# Patient Record
Sex: MALE | Race: ASIAN | NOT HISPANIC OR LATINO
[De-identification: names, ages, dates, MRNs, and addresses within clinical notes are randomized per-mention and may not be internally consistent; named-entity substitution may affect disease eponyms.]

---

## 2018-10-30 ENCOUNTER — APPOINTMENT (OUTPATIENT)
Dept: UROLOGY | Facility: CLINIC | Age: 63
End: 2018-10-30
Payer: MEDICARE

## 2018-10-30 VITALS
RESPIRATION RATE: 18 BRPM | HEIGHT: 67 IN | BODY MASS INDEX: 20.09 KG/M2 | WEIGHT: 128 LBS | HEART RATE: 77 BPM | OXYGEN SATURATION: 98 % | SYSTOLIC BLOOD PRESSURE: 127 MMHG | TEMPERATURE: 97.4 F | DIASTOLIC BLOOD PRESSURE: 67 MMHG

## 2018-10-30 LAB
ANION GAP SERPL CALC-SCNC: 10 MMOL/L
BUN SERPL-MCNC: 43 MG/DL
CALCIUM SERPL-MCNC: 9.8 MG/DL
CHLORIDE SERPL-SCNC: 104 MMOL/L
CO2 SERPL-SCNC: 21 MMOL/L
CREAT SERPL-MCNC: 3.65 MG/DL
GLUCOSE SERPL-MCNC: 203 MG/DL
POTASSIUM SERPL-SCNC: 4.2 MMOL/L
PSA FREE FLD-MCNC: 37.5
PSA FREE SERPL-MCNC: 0.54 NG/ML
PSA SERPL-MCNC: 1.44 NG/ML
SODIUM SERPL-SCNC: 135 MMOL/L

## 2018-10-30 PROCEDURE — 99214 OFFICE O/P EST MOD 30 MIN: CPT

## 2018-10-31 LAB
APPEARANCE: CLEAR
BACTERIA: NEGATIVE
BILIRUBIN URINE: NEGATIVE
BLOOD URINE: ABNORMAL
COLOR: YELLOW
GLUCOSE QUALITATIVE U: 500 MG/DL
HYALINE CASTS: 1 /LPF
KETONES URINE: NEGATIVE
LEUKOCYTE ESTERASE URINE: NEGATIVE
MICROSCOPIC-UA: NORMAL
NITRITE URINE: NEGATIVE
PH URINE: 6
PROTEIN URINE: 100 MG/DL
RED BLOOD CELLS URINE: 1 /HPF
SPECIFIC GRAVITY URINE: 1.01
SQUAMOUS EPITHELIAL CELLS: 0 /HPF
UROBILINOGEN URINE: NEGATIVE MG/DL
WHITE BLOOD CELLS URINE: 1 /HPF

## 2018-11-27 ENCOUNTER — APPOINTMENT (OUTPATIENT)
Dept: UROLOGY | Facility: CLINIC | Age: 63
End: 2018-11-27
Payer: MEDICARE

## 2018-11-27 VITALS
TEMPERATURE: 97.6 F | BODY MASS INDEX: 19.89 KG/M2 | HEART RATE: 76 BPM | WEIGHT: 127 LBS | OXYGEN SATURATION: 98 % | DIASTOLIC BLOOD PRESSURE: 68 MMHG | SYSTOLIC BLOOD PRESSURE: 116 MMHG | RESPIRATION RATE: 17 BRPM

## 2018-11-27 PROCEDURE — 99214 OFFICE O/P EST MOD 30 MIN: CPT

## 2019-05-28 ENCOUNTER — APPOINTMENT (OUTPATIENT)
Dept: UROLOGY | Facility: CLINIC | Age: 64
End: 2019-05-28
Payer: MEDICARE

## 2019-05-28 VITALS
DIASTOLIC BLOOD PRESSURE: 65 MMHG | HEART RATE: 67 BPM | BODY MASS INDEX: 19.73 KG/M2 | TEMPERATURE: 97.2 F | SYSTOLIC BLOOD PRESSURE: 119 MMHG | WEIGHT: 126 LBS | RESPIRATION RATE: 17 BRPM | OXYGEN SATURATION: 97 %

## 2019-05-28 PROCEDURE — 99214 OFFICE O/P EST MOD 30 MIN: CPT

## 2019-05-28 NOTE — LETTER BODY
[FreeTextEntry1] : Gato Wallis MD\par [address], \[place] [address]\par Phone: [phone]\par \par Dear Dr. Wallis,\par \par Reason for visit: Abnormal urinalysis, proteinuria. Chronic renal impairment. BPH.\par \par This is a 63 year-old gentleman with history of HIV was found to have abnormal urinalysis, chronic renal impairment and BPH. His most recent blood test demonstrated undetectable viral count. The patient reports he has an unremarkable renal ultrasound. He returns today for follow up. Since his last visit, the patient reports taking Flomax regularly. He reports improved urinary symptoms. He denies any hematuria or urinary incontinence. His symptoms are aggravated by hydration. He denies any alleviating factors. The patient denies any interference of function. The patient is entirely asymptomatic. The past medical history, family history and social history are unchanged. All other review of systems are negative. Patient denies any changes in medications. Medication list was reconciled.\par  \par On examination, the patient is a frail-appearing gentleman in no acute distress. He is alert and oriented follows commands. He has normal mood and affect. He is normocephalic. Neck is supple. Respirations are unlabored. His abdomen is soft and nontender. Bladder is nonpalpable. No CVA tenderness. Neurologically he is grossly intact. No peripheral edema. Skin without gross abnormality. \par \par His BMP demonstrated renal failure, creatinine 3.65. His PSA was 1.44, which is within normal limits. His urinalysis demonstrated proteinuria, 100 mg/dL. \par \par Assessment: Abnormal urinalysis, proteinuria. Chronic renal impairment, Stage 5. BPH.\par \par I counseled the patient. In terms of his proteinuria, the patient reports he has consulted nephrology. In terms of his BPH, the patient is complains of headaches and is concerned that for side effects of prostate medications. I recommend the patient to discontinue terazosin and continue Flomax as directed. Risks and alternatives were discussed. I answered the patient questions. The patient will follow-up as directed and will contact me with any questions or concerns. Thank you for the opportunity to participate in the care of Mr. GRIGGS. I will keep you updated on his progress. \par \par Plan: Continue Flomax. Follow up in one year.\par

## 2019-05-28 NOTE — ADDENDUM
[FreeTextEntry1] :  I, Alla Mondragon, acted solely as a scribe for Dr. Sebastian Kern. The documentation recorded by the scribe accurately reflects the service I personally performed and the decision by me.\par

## 2020-05-29 ENCOUNTER — APPOINTMENT (OUTPATIENT)
Dept: UROLOGY | Facility: CLINIC | Age: 65
End: 2020-05-29
Payer: MEDICARE

## 2020-05-29 VITALS
OXYGEN SATURATION: 98 % | TEMPERATURE: 97.7 F | SYSTOLIC BLOOD PRESSURE: 148 MMHG | DIASTOLIC BLOOD PRESSURE: 72 MMHG | RESPIRATION RATE: 18 BRPM | HEART RATE: 70 BPM

## 2020-05-29 PROCEDURE — 99214 OFFICE O/P EST MOD 30 MIN: CPT

## 2020-05-29 NOTE — ADDENDUM
[FreeTextEntry1] : Entered by Octavio Emery, acting as scribe for Dr. Sebastian Kern.\par \par The documentation recorded by the scribe accurately reflects the service I personally performed and the decisions made by me.

## 2020-05-29 NOTE — LETTER BODY
[FreeTextEntry1] : Gato Wallis MD\par [address], \[place] [address]\par Phone: [phone]\par \par Dear Dr. Wallis,\par \par Reason for visit: Chronic renal impairment, stage 5. BPH.\par \par This is a 64 year-old gentleman with history of HIV presenting with abnormal urinalysis, chronic renal impairment, stage 5 and BPH. He returns today for follow up. Since his last visit, the patient continues to take Flomax once a day regularly without any side effects or difficulties. He reports of stable urinary symptoms with medication. He denies any interval difficulties or complaints. He denies any hematuria, dysuria, or urinary incontinence. He denies any changes in health. He has no pain currently. The past medical history, family history and social history are unchanged. All other review of systems are negative. Patient denies any changes in medications. Medication list was reconciled. \par  \par On examination, the patient is a frail-appearing gentleman in no acute distress. He is alert and oriented follows commands. He has normal mood and affect. He is normocephalic. Neck is supple. Respirations are unlabored. His abdomen is soft and nontender. Bladder is nonpalpable. No CVA tenderness. Neurologically he is grossly intact. No peripheral edema. Skin without gross abnormality. \par \par His BMP in 2018 demonstrated renal failure, creatinine 3.65. His PSA was 1.44, which is within normal limits. His urinalysis demonstrated proteinuria, 100 mg/dL. \par \par Assessment: Proteinuria. Chronic renal impairment, Stage 5. BPH.\par \par I counseled the patient. In terms of his BPH, the patient reports of stable urinary symptoms with medical therapy. I renewed the patient's prescription for Flomax today. I encouraged the patient to continue medication regularly as directed. He will obtain BMP and PSA today to ensure stability. Risks and alternatives were discussed. I answered the patient questions. The patient will follow-up as directed and will contact me with any questions or concerns. Thank you for the opportunity to participate in the care of Mr. GRIGGS. I will keep you updated on his progress. \par \par Plan: Continue Flomax. BMP. PSA. Follow up as directed.\par \par I spent over half of the 25-minute encounter counseling the patient and coordinating his care.

## 2020-05-30 LAB
ANION GAP SERPL CALC-SCNC: 15 MMOL/L
BUN SERPL-MCNC: 47 MG/DL
CALCIUM SERPL-MCNC: 9 MG/DL
CHLORIDE SERPL-SCNC: 110 MMOL/L
CO2 SERPL-SCNC: 13 MMOL/L
CREAT SERPL-MCNC: 4.18 MG/DL
GLUCOSE SERPL-MCNC: 90 MG/DL
POTASSIUM SERPL-SCNC: 4.4 MMOL/L
PSA FREE FLD-MCNC: 42 %
PSA FREE SERPL-MCNC: 0.6 NG/ML
PSA SERPL-MCNC: 1.43 NG/ML
SODIUM SERPL-SCNC: 139 MMOL/L

## 2021-05-02 ENCOUNTER — RX RENEWAL (OUTPATIENT)
Age: 66
End: 2021-05-02

## 2021-05-28 ENCOUNTER — APPOINTMENT (OUTPATIENT)
Dept: UROLOGY | Facility: CLINIC | Age: 66
End: 2021-05-28
Payer: MEDICARE

## 2021-05-28 VITALS
HEART RATE: 83 BPM | OXYGEN SATURATION: 97 % | TEMPERATURE: 98.3 F | SYSTOLIC BLOOD PRESSURE: 131 MMHG | WEIGHT: 125 LBS | DIASTOLIC BLOOD PRESSURE: 74 MMHG | BODY MASS INDEX: 19.58 KG/M2 | RESPIRATION RATE: 18 BRPM

## 2021-05-28 DIAGNOSIS — R35.1 NOCTURIA: ICD-10-CM

## 2021-05-28 DIAGNOSIS — B20 HUMAN IMMUNODEFICIENCY VIRUS [HIV] DISEASE: ICD-10-CM

## 2021-05-28 DIAGNOSIS — N36.8 OTHER SPECIFIED DISORDERS OF URETHRA: ICD-10-CM

## 2021-05-28 PROCEDURE — 99214 OFFICE O/P EST MOD 30 MIN: CPT

## 2021-05-29 LAB
PSA FREE FLD-MCNC: 36 %
PSA FREE SERPL-MCNC: 1.01 NG/ML
PSA SERPL-MCNC: 2.83 NG/ML

## 2021-05-29 NOTE — ADDENDUM
[FreeTextEntry1] : Entered by Oscar Pressley, acting as scribe for Dr. Sebastian Kern.\par \par The documentation recorded by the scribe accurately reflects the service I personally performed and the decisions made by me.\par

## 2021-05-29 NOTE — LETTER BODY
[FreeTextEntry1] : Gato Wallis MD\par [address], \[place] [address]\par Phone: [phone]\par \par Dear Dr. Wallis,\par \par Reason for visit: Chronic renal impairment, stage 5. BPH.\par \par This is a 64 year-old gentleman with history of HIV presenting with previous abnormal urinalysis, chronic renal impairment, stage 5 and chronic BPH. The patient returns today for follow up. Since he was last seen, he continues to take Flomax QD regularly without any side effects or difficulties. The patient notes stable urine flow and urinary symptoms on medical therapy. He denies any interval difficulties or complaints. He denies any hematuria, dysuria, or urinary incontinence.The patient is still awaiting a kidney transplant He denies any changes in health. He has no pain currently. The past medical history, family history and social history are unchanged. All other review of systems are negative. Patient denies any changes in medications. Medication list was reconciled. \par  \par On examination, the patient is a frail-appearing gentleman in no acute distress. He is alert and oriented follows commands. He has normal mood and affect. He is normocephalic. Neck is supple. Respirations are unlabored. His abdomen is soft and nontender. Bladder is nonpalpable. No CVA tenderness. Neurologically he is grossly intact. No peripheral edema. Skin without gross abnormality. On rectal examination, there is normal sphincter tone. The prostate is clinically benign without focal induration or nodularity. \par \par His last BMP in May 2020 demonstrated significantly impaired renal functions, creatinine 4.18. His PSA was 1.43, which is within normal limits. \par \par Assessment: Proteinuria. Chronic renal impairment, Stage 5. BPH.\par \par I counseled the patient. In terms of his BPH, his symptoms are stabled on medical therapy. I recommended he continue taking Flomax QD. I renewed the patient's prescription for Flomax today. I encouraged the patient to continue medications regularly as directed. The patient will repeat BMP and PSA today to ensure stability. Risks and alternatives were discussed. I answered the patient questions. The patient will follow-up in 1 year and will contact me with any questions or concerns. Thank you for the opportunity to participate in the care of Mr. GRIGGS. I will keep you updated on his progress.\par \par Plan: Continue Flomax. BMP. PSA. Follow up 1 year. \par \par I spent 30-minutes time today on all issues related to this patient on today date of service including non face to face time. \par

## 2021-06-01 LAB
ANION GAP SERPL CALC-SCNC: 16 MMOL/L
BACTERIA UR CULT: ABNORMAL
BUN SERPL-MCNC: 65 MG/DL
CALCIUM SERPL-MCNC: 10.3 MG/DL
CHLORIDE SERPL-SCNC: 99 MMOL/L
CO2 SERPL-SCNC: 20 MMOL/L
CREAT SERPL-MCNC: 4.92 MG/DL
GLUCOSE SERPL-MCNC: 137 MG/DL
POTASSIUM SERPL-SCNC: 3.7 MMOL/L
SODIUM SERPL-SCNC: 135 MMOL/L

## 2022-05-27 ENCOUNTER — APPOINTMENT (OUTPATIENT)
Dept: UROLOGY | Facility: CLINIC | Age: 67
End: 2022-05-27
Payer: MEDICARE

## 2022-05-27 VITALS
SYSTOLIC BLOOD PRESSURE: 129 MMHG | HEART RATE: 93 BPM | TEMPERATURE: 98.1 F | DIASTOLIC BLOOD PRESSURE: 63 MMHG | WEIGHT: 122 LBS | BODY MASS INDEX: 19.11 KG/M2 | RESPIRATION RATE: 18 BRPM | OXYGEN SATURATION: 95 %

## 2022-05-27 PROCEDURE — 99214 OFFICE O/P EST MOD 30 MIN: CPT

## 2022-05-28 LAB
ANION GAP SERPL CALC-SCNC: 13 MMOL/L
APPEARANCE: CLEAR
BACTERIA UR CULT: NORMAL
BACTERIA: NEGATIVE
BILIRUBIN URINE: NEGATIVE
BLOOD URINE: NORMAL
BUN SERPL-MCNC: 63 MG/DL
CALCIUM SERPL-MCNC: 9.2 MG/DL
CHLORIDE SERPL-SCNC: 111 MMOL/L
CO2 SERPL-SCNC: 15 MMOL/L
COLOR: NORMAL
CREAT SERPL-MCNC: 5.35 MG/DL
EGFR: 11 ML/MIN/1.73M2
GLUCOSE QUALITATIVE U: ABNORMAL
GLUCOSE SERPL-MCNC: 172 MG/DL
HYALINE CASTS: 0 /LPF
KETONES URINE: NEGATIVE
LEUKOCYTE ESTERASE URINE: NEGATIVE
MICROSCOPIC-UA: NORMAL
NITRITE URINE: NEGATIVE
PH URINE: 6
POTASSIUM SERPL-SCNC: 4.7 MMOL/L
PROTEIN URINE: ABNORMAL
PSA FREE FLD-MCNC: 35 %
PSA FREE SERPL-MCNC: 0.75 NG/ML
PSA SERPL-MCNC: 2.14 NG/ML
RED BLOOD CELLS URINE: 1 /HPF
SODIUM SERPL-SCNC: 139 MMOL/L
SPECIFIC GRAVITY URINE: 1.01
SQUAMOUS EPITHELIAL CELLS: 0 /HPF
UROBILINOGEN URINE: NORMAL
WHITE BLOOD CELLS URINE: 0 /HPF

## 2022-06-03 NOTE — LETTER BODY
[FreeTextEntry1] : Gato Wallis MD\par [address], \[place] [address]\par Phone: [phone]\par \par Dear Dr. Wallis,\par \par Reason for visit: Chronic renal impairment, stage 5. BPH.\par \par This is a 66 year-old gentleman with history of HIV presenting with previous abnormal urinalysis, chronic renal impairment, stage 5 and chronic BPH. The patient returns today for follow up. Since he was last seen, he denies any interval difficulties or complaints. He is not taking any prostatic medications. His previous urine culture was positive for Streptococcus agalactiae. The patient declined antibiotics since he was asymptomatic. Currently, he denies any hematuria, dysuria, or urinary incontinence. The patient is still awaiting a kidney transplant. He has no pain currently. The past medical history, family history and social history are unchanged. All other review of systems are negative. Patient denies any changes in medications. Medication list was reconciled. \par  \par On examination, the patient is a frail-appearing gentleman in no acute distress. He is alert and oriented follows commands. He has normal mood and affect. He is normocephalic. Neck is supple. Respirations are unlabored. His abdomen is soft and nontender. Bladder is nonpalpable. No CVA tenderness. Neurologically he is grossly intact. No peripheral edema. Skin without gross abnormality. On rectal examination, there is normal sphincter tone. The prostate is clinically benign without focal induration or nodularity. \par \par His last BMP in May 2021 demonstrated significantly impaired renal functions, creatinine 4.92.  His PSA was 2.83, which is within normal limits. His urine culture was positive for Streptococcus agalactiae. \par \par Assessment: Chronic renal impairment, Stage 5. BPH.\par \par I counseled the patient. In terms of his chronic renal impairment, the patient is still awaiting a transplant. I encouraged the patient to continue following up with Nephrology.  In terms of his BPH, the patient offer no urinary complaints. I recommended he repeat BMP and PSA today to ensure stability. I also recommended he repeat urinalysis and urine culture to evaluate for infection. Patient understands that if he develops gross hematuria or any urinary discomfort, he will contact me for further evaluation. Risks and alternatives were discussed. I answered the patient questions. The patient will follow-up in 1 year and will contact me with any questions or concerns. Thank you for the opportunity to participate in the care of Mr. GRIGGS. I will keep you updated on his progress.\par \par Plan: BMP. PSA. Urinalysis. Urine culture. Follow up 1 year.

## 2022-06-03 NOTE — ADDENDUM
[FreeTextEntry1] : Entered by Oscar Pressley, acting as scribe for Dr. Sebastian Kern.\par \par The documentation recorded by the scribe accurately reflects the service I personally performed and the decisions made by me.

## 2023-06-02 ENCOUNTER — APPOINTMENT (OUTPATIENT)
Dept: UROLOGY | Facility: CLINIC | Age: 68
End: 2023-06-02
Payer: MEDICARE

## 2023-06-02 VITALS
SYSTOLIC BLOOD PRESSURE: 134 MMHG | HEART RATE: 106 BPM | BODY MASS INDEX: 16.6 KG/M2 | OXYGEN SATURATION: 100 % | TEMPERATURE: 98.1 F | RESPIRATION RATE: 18 BRPM | DIASTOLIC BLOOD PRESSURE: 73 MMHG | WEIGHT: 106 LBS

## 2023-06-02 DIAGNOSIS — B20 HUMAN IMMUNODEFICIENCY VIRUS [HIV] DISEASE: ICD-10-CM

## 2023-06-02 PROCEDURE — 99213 OFFICE O/P EST LOW 20 MIN: CPT

## 2023-06-02 NOTE — LETTER BODY
[FreeTextEntry1] : Gato Wallis MD\par [address], \[place] [address]\par Phone: [phone]\par \par Dear Dr. Wallis,\par \par Reason for visit: Chronic renal impairment, stage 5. BPH.\par \par This is a 67 year-old gentleman with history of HIV presenting with previous abnormal urinalysis, chronic renal impairment, stage 5 and chronic BPH. The patient returns today for follow up. Since he was last seen, he reports taking Flomax QD regularly without any difficulties or side effects. Patient reports improvement of symptoms on medical therapy. He underwent kidney and liver transplant 5 months ago. He now has diabetes from renal therapy. His previous urine culture was positive for Streptococcus agalactiae. The patient declined antibiotics since he was asymptomatic. Currently, he denies any hematuria, dysuria, or urinary incontinence. The patient is still awaiting a kidney transplant. He has no pain currently. The past medical history, family history and social history are unchanged. All other review of systems are negative. Patient denies any changes in medications. Medication list was reconciled. \par  \par On examination, the patient is a frail-appearing gentleman in no acute distress. He is alert and oriented follows commands. He has normal mood and affect. He is normocephalic. Neck is supple. Respirations are unlabored. His abdomen is soft and nontender. Bladder is nonpalpable. No CVA tenderness. Neurologically he is grossly intact. No peripheral edema. Skin without gross abnormality. On rectal examination, there is normal sphincter tone. The prostate is clinically benign without focal induration or nodularity.\par \par Assessment: Chronic renal impairment, Stage 5 s/p transplant. BPH.\par \par I counseled the patient. In terms of his chronic renal impairment, the patient underwent renal and liver transplant. He now has diabetes from renal therapy. I recommended he follow up with transplant surgery for further evaluation. In terms of his BPH, the patient reports doing well on Flomax QD. I renewed the patient's prescription for Flomax today. I encouraged the patient to continue medications regularly as directed. I recommended he repeat BMP and PSA today to ensure stability. Patient understands that if he develops gross hematuria or any urinary discomfort, he will contact me for further evaluation. Risks and alternatives were discussed. I answered the patient questions. The patient will follow-up in 1 year and will contact me with any questions or concerns. Thank you for the opportunity to participate in the care of Mr. GRIGGS. I will keep you updated on his progress.\par \par Plan: Continue Flomax. Follow up with transplant surgery. BMP. PSA. Follow up 1 year.

## 2023-06-02 NOTE — ADDENDUM
[FreeTextEntry1] : Entered by CHANCE CHAVES, acting as scribe for Dr. Sebastian Kern.\par The documentation recorded by the scribe accurately reflects the service I personally performed and the decisions made by me.

## 2023-06-07 ENCOUNTER — NON-APPOINTMENT (OUTPATIENT)
Age: 68
End: 2023-06-07

## 2023-06-07 DIAGNOSIS — Z00.00 ENCOUNTER FOR GENERAL ADULT MEDICAL EXAMINATION W/OUT ABNORMAL FINDINGS: ICD-10-CM

## 2023-06-07 LAB
ANION GAP SERPL CALC-SCNC: 13 MMOL/L
APPEARANCE: CLEAR
BACTERIA UR CULT: NORMAL
BACTERIA: NEGATIVE /HPF
BILIRUBIN URINE: NEGATIVE
BLOOD URINE: NEGATIVE
BUN SERPL-MCNC: 57 MG/DL
CALCIUM SERPL-MCNC: 9.4 MG/DL
CAST: 8 /LPF
CHLORIDE SERPL-SCNC: 110 MMOL/L
CO2 SERPL-SCNC: 17 MMOL/L
COLOR: YELLOW
CREAT SERPL-MCNC: 1.52 MG/DL
EGFR: 49 ML/MIN/1.73M2
EPITHELIAL CELLS: 2 /HPF
GLUCOSE QUALITATIVE U: 250 MG/DL
GLUCOSE SERPL-MCNC: 154 MG/DL
HYALINE CASTS: PRESENT
KETONES URINE: NEGATIVE MG/DL
LEUKOCYTE ESTERASE URINE: NEGATIVE
MICROSCOPIC-UA: NORMAL
NITRITE URINE: NEGATIVE
PH URINE: 5.5
POTASSIUM SERPL-SCNC: 5.1 MMOL/L
PROTEIN URINE: 100 MG/DL
PSA FREE FLD-MCNC: 22 %
PSA FREE SERPL-MCNC: 1.02 NG/ML
PSA SERPL-MCNC: 4.73 NG/ML
RED BLOOD CELLS URINE: 1 /HPF
REVIEW: NORMAL
SODIUM SERPL-SCNC: 140 MMOL/L
SPECIFIC GRAVITY URINE: 1.02
UROBILINOGEN URINE: 0.2 MG/DL
WHITE BLOOD CELLS URINE: 2 /HPF

## 2024-05-31 ENCOUNTER — APPOINTMENT (OUTPATIENT)
Dept: UROLOGY | Facility: CLINIC | Age: 69
End: 2024-05-31
Payer: MEDICARE

## 2024-05-31 VITALS
SYSTOLIC BLOOD PRESSURE: 157 MMHG | WEIGHT: 119 LBS | DIASTOLIC BLOOD PRESSURE: 72 MMHG | OXYGEN SATURATION: 99 % | RESPIRATION RATE: 18 BRPM | BODY MASS INDEX: 18.64 KG/M2 | HEART RATE: 80 BPM | TEMPERATURE: 98.1 F

## 2024-05-31 DIAGNOSIS — N40.1 BENIGN PROSTATIC HYPERPLASIA WITH LOWER URINARY TRACT SYMPMS: ICD-10-CM

## 2024-05-31 DIAGNOSIS — N13.8 BENIGN PROSTATIC HYPERPLASIA WITH LOWER URINARY TRACT SYMPMS: ICD-10-CM

## 2024-05-31 DIAGNOSIS — N18.9 CHRONIC KIDNEY DISEASE, UNSPECIFIED: ICD-10-CM

## 2024-05-31 DIAGNOSIS — R80.9 PROTEINURIA, UNSPECIFIED: ICD-10-CM

## 2024-05-31 PROCEDURE — G2211 COMPLEX E/M VISIT ADD ON: CPT

## 2024-05-31 PROCEDURE — 99214 OFFICE O/P EST MOD 30 MIN: CPT

## 2024-05-31 RX ORDER — SODIUM PHOSPHATE, DIBASIC AND SODIUM PHOSPHATE, MONOBASIC 7; 19 G/230ML; G/230ML
ENEMA RECTAL
Qty: 1 | Refills: 3 | Status: ACTIVE | COMMUNITY
Start: 2024-05-31 | End: 1900-01-01

## 2024-05-31 RX ORDER — TAMSULOSIN HYDROCHLORIDE 0.4 MG/1
0.4 CAPSULE ORAL
Qty: 90 | Refills: 3 | Status: ACTIVE | COMMUNITY
Start: 2018-10-30 | End: 1900-01-01

## 2024-06-01 LAB
ANION GAP SERPL CALC-SCNC: 18 MMOL/L
BUN SERPL-MCNC: 69 MG/DL
CALCIUM SERPL-MCNC: 9.5 MG/DL
CHLORIDE SERPL-SCNC: 103 MMOL/L
CO2 SERPL-SCNC: 18 MMOL/L
CREAT SERPL-MCNC: 2 MG/DL
EGFR: 35 ML/MIN/1.73M2
GLUCOSE SERPL-MCNC: 124 MG/DL
POTASSIUM SERPL-SCNC: 5.2 MMOL/L
PSA FREE FLD-MCNC: 18 %
PSA FREE SERPL-MCNC: 0.85 NG/ML
PSA SERPL-MCNC: 4.62 NG/ML
SODIUM SERPL-SCNC: 139 MMOL/L

## 2024-06-01 NOTE — ADDENDUM
[FreeTextEntry1] : Entered by Isaiah Aceves, acting as scribe for Dr. Sebastian Kern. The documentation recorded by the scribe accurately reflects the service I personally performed and the decisions made by me.

## 2024-06-01 NOTE — LETTER BODY
[FreeTextEntry1] : Gato Wallis MD [address] , [place] [address] Phone: [phone]  Dear Dr. Wallis,    Reason for visit: Chronic renal impairment, stage 5. BPH. Elevated PSA    This is a 68 year-old gentleman with history of HIV presenting with previous abnormal urinalysis, chronic renal impairment, stage 5, chronic BPH and elevated PSA. The patient returns today for follow up. Since he was last seen, he reports taking Flomax QD regularly without any difficulties or side effects. Patient reports stable symptoms on medical therapy. Currently, he denies any hematuria, dysuria, or urinary incontinence. He has no pain currently. The past medical history, family history and social history are unchanged. Patient has history of kidney and liver transplant. All other review of systems are negative. Patient denies any changes in medications. Medication list was reconciled.    On examination, the patient is a frail-appearing gentleman in no acute distress. He is alert and oriented follows commands. He has normal mood and affect. He is normocephalic. Neck is supple. Respirations are unlabored. His abdomen is soft and nontender. Bladder is nonpalpable. No CVA tenderness. Neurologically he is grossly intact. No peripheral edema. Skin without gross abnormality.    His BMP in June 2023 demonstrated decreased renal function, creatinine 1.52. His PSA was 4.73.   Assessment: Chronic renal impairment, Stage 5 s/p transplant. BPH. Elevated PSA    I counseled the patient.  In terms of his BPH, the patient reports doing well on Flomax QD. I renewed the patient's prescription for Flomax today. I encouraged the patient to continue medications regularly as directed. Patient had previous elevated PSA of 4.7. He did not repeat PSA.  I recommended he repeat BMP and PSA today to ensure stability.  I recommended patient undergo prostate MRI for fusion biopsy for elevated PSA. Patient understands that if he develops gross hematuria or any urinary discomfort, he will contact me for further evaluation. I counseled the patient regarding the procedure. The risks and benefits were discussed. Alternatives were given. I answered the patient questions. The patient will take the necessary preparations for the procedure. The patient will follow-up as directed and will contact me with any questions or concerns. Thank you for the opportunity to participate in the care of Mr. GRIGGS. I will keep you updated on his progress.  Patient will continue longitudinal care for their complex and chronic condition.  Plan: Continue Flomax. Prostate MRI. Fusion Biopsy. Follow up as directed.  I spent 30-minutes time today on all issues related to this patient on today date of service including non face to face time.

## 2024-06-02 LAB
APPEARANCE: CLEAR
BACTERIA UR CULT: NORMAL
BACTERIA: NEGATIVE /HPF
BILIRUBIN URINE: NEGATIVE
BLOOD URINE: NEGATIVE
CAST: 7 /LPF
COLOR: YELLOW
EPITHELIAL CELLS: 2 /HPF
GLUCOSE QUALITATIVE U: NEGATIVE MG/DL
HYALINE CASTS: PRESENT
KETONES URINE: NEGATIVE MG/DL
LEUKOCYTE ESTERASE URINE: NEGATIVE
MICROSCOPIC-UA: NORMAL
NITRITE URINE: NEGATIVE
PH URINE: 5.5
PROTEIN URINE: 100 MG/DL
RED BLOOD CELLS URINE: 0 /HPF
REVIEW: NORMAL
SPECIFIC GRAVITY URINE: 1.02
UROBILINOGEN URINE: 0.2 MG/DL
WHITE BLOOD CELLS URINE: 2 /HPF

## 2024-06-23 DIAGNOSIS — R97.20 ELEVATED PROSTATE, SPECIFIC ANTIGEN [PSA]: ICD-10-CM

## 2024-06-23 DIAGNOSIS — N18.5 CHRONIC KIDNEY DISEASE, STAGE 5: ICD-10-CM

## 2024-06-26 ENCOUNTER — OUTPATIENT (OUTPATIENT)
Dept: OUTPATIENT SERVICES | Facility: HOSPITAL | Age: 69
LOS: 1 days | End: 2024-06-26
Payer: MEDICARE

## 2024-06-26 DIAGNOSIS — R97.20 ELEVATED PROSTATE SPECIFIC ANTIGEN [PSA]: ICD-10-CM

## 2024-06-26 PROCEDURE — C8001: CPT

## 2024-07-05 ENCOUNTER — APPOINTMENT (OUTPATIENT)
Dept: UROLOGY | Facility: CLINIC | Age: 69
End: 2024-07-05
Payer: MEDICARE

## 2024-07-05 VITALS
OXYGEN SATURATION: 100 % | WEIGHT: 115 LBS | HEART RATE: 82 BPM | SYSTOLIC BLOOD PRESSURE: 116 MMHG | BODY MASS INDEX: 18.01 KG/M2 | DIASTOLIC BLOOD PRESSURE: 67 MMHG | TEMPERATURE: 97.7 F | RESPIRATION RATE: 18 BRPM

## 2024-07-05 VITALS — HEART RATE: 66 BPM | SYSTOLIC BLOOD PRESSURE: 129 MMHG | OXYGEN SATURATION: 99 % | DIASTOLIC BLOOD PRESSURE: 71 MMHG

## 2024-07-05 DIAGNOSIS — R97.20 ELEVATED PROSTATE, SPECIFIC ANTIGEN [PSA]: ICD-10-CM

## 2024-07-05 DIAGNOSIS — N18.5 CHRONIC KIDNEY DISEASE, STAGE 5: ICD-10-CM

## 2024-07-05 PROCEDURE — 76872 US TRANSRECTAL: CPT

## 2024-07-05 PROCEDURE — 55700: CPT

## 2024-07-05 RX ORDER — CIPROFLOXACIN HYDROCHLORIDE 500 MG/1
500 TABLET, FILM COATED ORAL
Refills: 0 | Status: COMPLETED | OUTPATIENT
Start: 2024-07-05

## 2024-07-05 RX ADMIN — CIPROFLOXACIN 0 MG: 500 TABLET, FILM COATED ORAL at 00:00

## 2024-07-10 DIAGNOSIS — C61 MALIGNANT NEOPLASM OF PROSTATE: ICD-10-CM

## 2024-07-10 LAB — PROSTATE BIOPSY: NORMAL

## 2024-07-26 ENCOUNTER — APPOINTMENT (OUTPATIENT)
Dept: UROLOGY | Facility: CLINIC | Age: 69
End: 2024-07-26
Payer: MEDICARE

## 2024-07-26 VITALS
WEIGHT: 117 LBS | RESPIRATION RATE: 18 BRPM | SYSTOLIC BLOOD PRESSURE: 131 MMHG | OXYGEN SATURATION: 99 % | HEART RATE: 81 BPM | BODY MASS INDEX: 18.32 KG/M2 | TEMPERATURE: 97 F | DIASTOLIC BLOOD PRESSURE: 58 MMHG

## 2024-07-26 PROCEDURE — 99214 OFFICE O/P EST MOD 30 MIN: CPT

## 2024-07-26 PROCEDURE — G2211 COMPLEX E/M VISIT ADD ON: CPT

## 2024-07-27 NOTE — LETTER BODY
[FreeTextEntry1] :   Gato Wallis MD [address] , [place] [address] Phone: [phone]  Dear Dr. Wallis,  Reason for visit: Prostate cancer.   This is a 68 year-year-old gentleman with history of HIV status post renal transplant for renal failure with elevated PSA, status post prostate biopsy. Patient returns today to discuss the results of the biopsy. Patient denies any side effects of difficulty of the prostate biopsy. [He has some mild hematuria which is improving.] He denies any fevers or chills. He denies any pain. Patient denies any changes in health. The past medical history and family history and social history are unchanged. All other review of systems are negative. Patient denies any changes in medications. Medication list was reconciled.   On examination, the patient is a older-appearing gentleman in no acute distress. He is alert and oriented follows commands. He has normal mood and affect. He is normocephalic. Oral no thrush. Neck is supple. Respirations are unlabored. His abdomen is soft and nontender. Liver is nonpalpable. Bladder is nonpalpable. No CVA tenderness. Neurologically he is grossly intact. No peripheral edema. Skin without gross abnormality.   Prostate biopsy demonstrated evidence of [place] 3+4 and 3+3 adenocarcinoma of the prostate involving 6 of 18 cores..   Assessment: Prostate cancer Heber grade group 2.   I counseled the patient on its clinical significance.  I discussed the risk of metastatic disease and the probability of organ confined disease. I discussed the treatment options available to him including expectant management, hormonal therapy, radiation, and surgery. The risks and benefits of each options were discussed in detail as well quality of life issues. I answered his questions. He wishes to consider his options.  Patient expressed interest in possible ration therapy.  He may need hormone therapy for 6 months given his Heber 7 prostate cancer with radiation therapy.  Risks and benefits were discussed. Alternatives were given. I answered the patient questions. The patient wishes to consider the procedure and discuss with his family.  The patient will follow-up as directed and will contact me with any questions or concerns or he has made a decision regarding the treatment of his prostate cancer.   Plan: Followup in 1 month.  Refer to radiation oncology.  Consider 6 months of gonadal androgen ablation.  I spent 30-minutes time today on all issues related to this patient on today date of service including non face to face time.

## 2024-07-27 NOTE — ADDENDUM
[FreeTextEntry1] : Entered by [employer] Gimenez, acting as scribe for Dr. Sebastian Kern. The documentation recorded by the scribe accurately reflects the service I personally performed and the decisions made by me.

## 2024-07-27 NOTE — LETTER BODY
[FreeTextEntry1] :   Gato Wallis MD [address] , [place] [address] Phone: [phone]  Dear Dr. Wallis,  Reason for visit: Prostate cancer.   This is a 68 year-year-old gentleman with history of HIV status post renal transplant for renal failure with elevated PSA, status post prostate biopsy. Patient returns today to discuss the results of the biopsy. Patient denies any side effects of difficulty of the prostate biopsy. [He has some mild hematuria which is improving.] He denies any fevers or chills. He denies any pain. Patient denies any changes in health. The past medical history and family history and social history are unchanged. All other review of systems are negative. Patient denies any changes in medications. Medication list was reconciled.   On examination, the patient is a older-appearing gentleman in no acute distress. He is alert and oriented follows commands. He has normal mood and affect. He is normocephalic. Oral no thrush. Neck is supple. Respirations are unlabored. His abdomen is soft and nontender. Liver is nonpalpable. Bladder is nonpalpable. No CVA tenderness. Neurologically he is grossly intact. No peripheral edema. Skin without gross abnormality.   Prostate biopsy demonstrated evidence of [place] 3+4 and 3+3 adenocarcinoma of the prostate involving 6 of 18 cores..   Assessment: Prostate cancer Heber grade group 2.   I counseled the patient on its clinical significance.  I discussed the risk of metastatic disease and the probability of organ confined disease. I discussed the treatment options available to him including expectant management, hormonal therapy, radiation, and surgery. The risks and benefits of each options were discussed in detail as well quality of life issues. I answered his questions. He wishes to consider his options.  Patient expressed interest in possible ration therapy.  He may need hormone therapy for 6 months given his Heber 7 prostate cancer with radiation therapy.  Risks and benefits were discussed. Alternatives were given. I answered the patient questions. The patient wishes to consider the procedure and discuss with his family.  The patient will follow-up as directed and will contact me with any questions or concerns or he has made a decision regarding the treatment of his prostate cancer.   Plan: Followup in 1 month.  Refer to radiation oncology.  Consider 6 months of gonadal androgen ablation.  I spent 30-minutes time today on all issues related to this patient on today date of service including non face to face time.

## 2024-08-29 ENCOUNTER — NON-APPOINTMENT (OUTPATIENT)
Age: 69
End: 2024-08-29

## 2024-08-30 ENCOUNTER — APPOINTMENT (OUTPATIENT)
Dept: UROLOGY | Facility: CLINIC | Age: 69
End: 2024-08-30
Payer: MEDICARE

## 2024-08-30 VITALS
TEMPERATURE: 97.9 F | BODY MASS INDEX: 18.28 KG/M2 | SYSTOLIC BLOOD PRESSURE: 127 MMHG | OXYGEN SATURATION: 99 % | RESPIRATION RATE: 18 BRPM | HEART RATE: 70 BPM | DIASTOLIC BLOOD PRESSURE: 71 MMHG | WEIGHT: 116.7 LBS

## 2024-08-30 PROCEDURE — G2211 COMPLEX E/M VISIT ADD ON: CPT

## 2024-08-30 PROCEDURE — 99214 OFFICE O/P EST MOD 30 MIN: CPT

## 2024-08-30 PROCEDURE — 51798 US URINE CAPACITY MEASURE: CPT

## 2024-08-31 LAB
APPEARANCE: CLEAR
BACTERIA: NEGATIVE /HPF
BILIRUBIN URINE: NEGATIVE
BLOOD URINE: NEGATIVE
CAST: 3 /LPF
COLOR: YELLOW
EPITHELIAL CELLS: 1 /HPF
GLUCOSE QUALITATIVE U: NEGATIVE MG/DL
KETONES URINE: NEGATIVE MG/DL
LEUKOCYTE ESTERASE URINE: ABNORMAL
MICROSCOPIC-UA: NORMAL
NITRITE URINE: NEGATIVE
PH URINE: 5.5
PROTEIN URINE: 100 MG/DL
RED BLOOD CELLS URINE: 0 /HPF
SPECIFIC GRAVITY URINE: 1.01
UROBILINOGEN URINE: 0.2 MG/DL
WHITE BLOOD CELLS URINE: 14 /HPF

## 2024-08-31 NOTE — HISTORY OF PRESENT ILLNESS
[FreeTextEntry1] : Follow-up prostate cancer, saw radiology oncology Follow-up BPH, on Flomax once daily, complain difficulty of urination and unable to empty bladder completely, PVR 99ml today  Please refer to URO Consult Note.

## 2024-08-31 NOTE — LETTER BODY
[FreeTextEntry1] :   Gato Wallis MD [address] , [place] [address] Phone: [phone]  Dear Dr. Wallis,  Reason for visit: Prostate cancer. Urinary frequency. BPH.  This is a 68 year-year-old gentleman with prostate cancer, urinary frequency, and BPH. His prostate biopsy demonstrated evidence of [place] 3+4 and 3+3 adenocarcinoma of the prostate involving 6 of 18 cores. He returns today for follow up. Since he was last seen, he reports that he saw radiation oncology and is now on radiation therapy for his prostate cancer.  Patient reports taking Flomax QD regularly with no side effects or difficulties. He reports persistent urinary symptoms and weak uroflow with medication. He also reports loose bowel movements. He denies any fevers or chills. He denies any pain. Patient denies any changes in health. The past medical history and family history and social history are unchanged. All other review of systems are negative. Patient denies any changes in medications. Medication list was reconciled.  On examination, the patient is a older-appearing gentleman in no acute distress. He is alert and oriented follows commands. He has normal mood and affect. He is normocephalic. Oral no thrush. Neck is supple. Respirations are unlabored. His abdomen is soft and nontender. Liver is nonpalpable. Bladder is nonpalpable. No CVA tenderness. Neurologically he is grossly intact. No peripheral edema. Skin without gross abnormality.   Post-void residual on bladder scan today was 99 cc.   Assessment: Prostate cancer. Urinary frequency. BPH. Loose bowel movement.  I counseled the patient. In terms of his prostate cancer, the patient reports that he currently on radiation therapy with radiation oncology. In terms of his BPH, the patient reports persistent urinary symptoms and weak uroflow despite medication. His PVR today was 99 cc. I recommended he increase Flomax to BID.  I renewed the patient's prescription for Flomax BID today. I encouraged the patient to continue medications regularly as directed. In terms of his loose bowel movement, his condition is likely secondary to radiation therapy. I recommended the patient follow up with his radiation oncology for management. Risks and benefits were discussed. Alternatives were given. I answered the patient questions. The patient wishes to consider the procedure and discuss with his family. The patient will follow-up as directed and will contact me with any questions or concerns or he has made a decision regarding the treatment of his prostate cancer.  Plan: Increase Flomax to BID. PVR. See radiation oncology. Follow up in 1 month.  I spent 30-minutes time today on all issues related to this patient on today date of service including non face to face time.

## 2024-08-31 NOTE — LETTER BODY
[FreeTextEntry1] :   Gato Wallis MD [address] , [place] [address] Phone: [phone]  Dear Dr. Wallis,  Reason for visit: Prostate cancer. Urinary frequency. BPH.  This is a 68 year-year-old gentleman with prostate cancer, urinary frequency, and BPH. His prostate biopsy demonstrated evidence of [place] 3+4 and 3+3 adenocarcinoma of the prostate involving 6 of 18 cores. He returns today for follow up. Since he was last seen, he reports that he saw radiation oncology and is now on radiation therapy for his prostate cancer.  Patient reports taking Flomax QD regularly with no side effects or difficulties. He reports persistent urinary symptoms and weak uroflow with medication. He also reports loose bowel movements. He denies any fevers or chills. He denies any pain. Patient denies any changes in health. The past medical history and family history and social history are unchanged. All other review of systems are negative. Patient denies any changes in medications. Medication list was reconciled.  On examination, the patient is a older-appearing gentleman in no acute distress. He is alert and oriented follows commands. He has normal mood and affect. He is normocephalic. Oral no thrush. Neck is supple. Respirations are unlabored. His abdomen is soft and nontender. Liver is nonpalpable. Bladder is nonpalpable. No CVA tenderness. Neurologically he is grossly intact. No peripheral edema. Skin without gross abnormality.   Post-void residual on bladder scan today was 99 cc.   Assessment: Prostate cancer. Urinary frequency. BPH. Loose bowel movement.  I counseled the patient. In terms of his prostate cancer, the patient reports that he currently on radiation therapy with radiation oncology. In terms of his BPH, the patient reports persistent urinary symptoms and weak uroflow despite medication. His PVR today was 99 cc. I recommended he increase Flomax to BID.  I renewed the patient's prescription for Flomax BID today. I encouraged the patient to continue medications regularly as directed. In terms of his loose bowel movement, his condition is likely secondary to radiation therapy. I recommended the patient follow up with his radiation oncology for management. Risks and benefits were discussed. Alternatives were given. I answered the patient questions. The patient wishes to consider the procedure and discuss with his family. The patient will follow-up as directed and will contact me with any questions or concerns or he has made a decision regarding the treatment of his prostate cancer.  Plan: Increase Flomax to BID. PVR. See radiation oncology. Follow up in 1 month.  I spent 30-minutes time today on all issues related to this patient on today date of service including non face to face time.

## 2024-09-01 LAB — BACTERIA UR CULT: NORMAL

## 2024-09-27 ENCOUNTER — APPOINTMENT (OUTPATIENT)
Dept: UROLOGY | Facility: CLINIC | Age: 69
End: 2024-09-27
Payer: MEDICARE

## 2024-09-27 VITALS
WEIGHT: 119 LBS | SYSTOLIC BLOOD PRESSURE: 144 MMHG | DIASTOLIC BLOOD PRESSURE: 73 MMHG | HEART RATE: 76 BPM | RESPIRATION RATE: 18 BRPM | OXYGEN SATURATION: 100 % | BODY MASS INDEX: 18.64 KG/M2

## 2024-09-27 DIAGNOSIS — N40.1 BENIGN PROSTATIC HYPERPLASIA WITH LOWER URINARY TRACT SYMPMS: ICD-10-CM

## 2024-09-27 DIAGNOSIS — R35.1 NOCTURIA: ICD-10-CM

## 2024-09-27 DIAGNOSIS — N13.8 BENIGN PROSTATIC HYPERPLASIA WITH LOWER URINARY TRACT SYMPMS: ICD-10-CM

## 2024-09-27 DIAGNOSIS — C61 MALIGNANT NEOPLASM OF PROSTATE: ICD-10-CM

## 2024-09-27 PROCEDURE — 99214 OFFICE O/P EST MOD 30 MIN: CPT

## 2024-09-27 PROCEDURE — G2211 COMPLEX E/M VISIT ADD ON: CPT

## 2024-09-27 PROCEDURE — 51798 US URINE CAPACITY MEASURE: CPT

## 2024-09-27 RX ORDER — OXYBUTYNIN CHLORIDE 5 MG/1
5 TABLET, EXTENDED RELEASE ORAL
Qty: 30 | Refills: 3 | Status: ACTIVE | COMMUNITY
Start: 2024-09-27 | End: 1900-01-01

## 2024-09-27 NOTE — LETTER BODY
[FreeTextEntry1] : Gato Wallis MD [address] , [place] [address] Phone: [phone]  Dear Dr. Wallis,  Reason for visit: Prostate cancer. Urinary frequency. BPH.  This is a 68-year-old gentleman with prostate cancer, urinary frequency, and BPH. His prostate biopsy demonstrated evidence of [place] 3+4 and 3+3 adenocarcinoma of the prostate involving 6 of 18 cores. He returns today for follow up. Since he was last seen, he reports that he has completed his radiation therapy for his prostate cancer. Patient reports taking Flomax BID regularly with no side effects or difficulties. He reports strong uroflow with medication. However, the patient notes persistent urinary frequency despite medication and bowel incontinence after radiation therapy. He denies any fevers or chills. He denies any pain. Patient denies any changes in health. The past medical history and family history and social history are unchanged. All other review of systems are negative. Patient denies any changes in medications. Medication list was reconciled.  On examination, the patient is a older-appearing gentleman in no acute distress. He is alert and oriented follows commands. He has normal mood and affect. He is normocephalic. Oral no thrush. Neck is supple. Respirations are unlabored. His abdomen is soft and nontender. Liver is nonpalpable. Bladder is nonpalpable. No CVA tenderness. Neurologically he is grossly intact. No peripheral edema. Skin without gross abnormality.   Post-void residual on bladder scan today was 19 cc.   Assessment: Prostate cancer. Urinary frequency. BPH. Bowel incontinence.  I counseled the patient. In terms of his prostate cancer, the patient reports that he has completed radiation therapy. I recommended the patient follow-up 6 months' time for repeat PSA testing. In terms of his BPH and urinary frequency, the patient reports strong urinary flow with Flomax BID. However, he reports of persistent urinary frequency. I recommended the patient continue taking Flomax BID and add Oxybutynin to the regiment. I discussed the potential side effects of the medication. I counseled the patient on its use and side effects. If the patient develops any side effects, the patient will discontinue medication and contact me. I renewed the patient's prescription for Flomax BID and Oxybutynin today. I encouraged the patient to continue medications regularly as directed. In terms of his bowel incontinence, his condition is likely secondary to radiation therapy. I recommended the patient follow up with his radiation oncology for management. Risks and benefits were discussed. Alternatives were given. I answered the patient questions. The patient will follow-up as directed and will contact me with any questions or concerns or he has made a decision regarding the treatment of his prostate cancer.   Plan: Add Oxybutynin. Continue Flomax BID. PVR. See radiation oncology. Follow up in 1 month. PSA testing in 6 months.   I spent 30-minutes time today on all issues related to this patient on today date of service including non face to face time.

## 2024-09-27 NOTE — LETTER BODY
[FreeTextEntry1] : Gato Wallis MD [address] , [place] [address] Phone: [phone]  Dear Dr. Wallis,  Reason for visit: Prostate cancer. Urinary frequency. BPH.  This is a 68-year-old gentleman with prostate cancer, urinary frequency, and BPH. His prostate biopsy demonstrated evidence of [place] 3+4 and 3+3 adenocarcinoma of the prostate involving 6 of 18 cores. He returns today for follow up. Since he was last seen, he reports that he has completed his radiation therapy for his prostate cancer. Patient reports taking Flomax BID regularly with no side effects or difficulties. He reports strong uroflow with medication. However, the patient notes persistent urinary frequency despite medication and bowel incontinence after radiation therapy. He denies any fevers or chills. He denies any pain. Patient denies any changes in health. The past medical history and family history and social history are unchanged. All other review of systems are negative. Patient denies any changes in medications. Medication list was reconciled.  On examination, the patient is a older-appearing gentleman in no acute distress. He is alert and oriented follows commands. He has normal mood and affect. He is normocephalic. Oral no thrush. Neck is supple. Respirations are unlabored. His abdomen is soft and nontender. Liver is nonpalpable. Bladder is nonpalpable. No CVA tenderness. Neurologically he is grossly intact. No peripheral edema. Skin without gross abnormality.   Post-void residual on bladder scan today was 19 cc.   Assessment: Prostate cancer. Urinary frequency. BPH. Bowel incontinence.  I counseled the patient. In terms of his prostate cancer, the patient reports that he has completed radiation therapy. I recommended the patient follow-up 6 months' time for repeat PSA testing. In terms of his BPH and urinary frequency, the patient reports strong urinary flow with Flomax BID. However, he reports of persistent urinary frequency. I recommended the patient continue taking Flomax BID and add Oxybutynin to the regiment. I discussed the potential side effects of the medication. I counseled the patient on its use and side effects. If the patient develops any side effects, the patient will discontinue medication and contact me. I renewed the patient's prescription for Flomax BID and Oxybutynin today. I encouraged the patient to continue medications regularly as directed. In terms of his bowel incontinence, his condition is likely secondary to radiation therapy. I recommended the patient follow up with his radiation oncology for management. Risks and benefits were discussed. Alternatives were given. I answered the patient questions. The patient will follow-up as directed and will contact me with any questions or concerns or he has made a decision regarding the treatment of his prostate cancer.   Plan: Add Oxybutynin. Continue Flomax BID. PVR. See radiation oncology. Follow up in 1 month. PSA testing in 6 months.   I spent 30-minutes time today on all issues related to this patient on today date of service including non face to face time.

## 2024-09-27 NOTE — HISTORY OF PRESENT ILLNESS
[FreeTextEntry1] : Follow-up for BPH with urinary frequency, increased Flomax to BID, patient reports persist urinary frequency due to radiation therapy. PVR 19ml today Follow-up for prostate cancer, on radiation therapy, last treatment was done on 9/24/2024  Please refer to URO Consult Note.

## 2024-09-27 NOTE — ADDENDUM
[FreeTextEntry1] : Entered by [email or web address], acting as scribe for Dr. Sebastian Kern. The documentation recorded by the scribe accurately reflects the service I personally performed and the decisions made by me.

## 2024-09-28 LAB
APPEARANCE: CLEAR
BACTERIA: NEGATIVE /HPF
BILIRUBIN URINE: NEGATIVE
BLOOD URINE: NEGATIVE
CAST: 8 /LPF
COLOR: YELLOW
EPITHELIAL CELLS: 2 /HPF
GLUCOSE QUALITATIVE U: NEGATIVE MG/DL
HYALINE CASTS: PRESENT
KETONES URINE: NEGATIVE MG/DL
LEUKOCYTE ESTERASE URINE: ABNORMAL
MICROSCOPIC-UA: NORMAL
NITRITE URINE: NEGATIVE
PH URINE: 5.5
PROTEIN URINE: 100 MG/DL
RED BLOOD CELLS URINE: 1 /HPF
REVIEW: NORMAL
SPECIFIC GRAVITY URINE: 1.02
UROBILINOGEN URINE: 0.2 MG/DL
WHITE BLOOD CELLS URINE: 3 /HPF

## 2024-09-30 LAB — BACTERIA UR CULT: NORMAL

## 2024-10-25 ENCOUNTER — APPOINTMENT (OUTPATIENT)
Dept: UROLOGY | Facility: CLINIC | Age: 69
End: 2024-10-25
Payer: MEDICARE

## 2024-10-25 VITALS
HEART RATE: 70 BPM | WEIGHT: 115 LBS | DIASTOLIC BLOOD PRESSURE: 70 MMHG | OXYGEN SATURATION: 100 % | RESPIRATION RATE: 18 BRPM | BODY MASS INDEX: 18.01 KG/M2 | SYSTOLIC BLOOD PRESSURE: 146 MMHG

## 2024-10-25 DIAGNOSIS — N40.1 BENIGN PROSTATIC HYPERPLASIA WITH LOWER URINARY TRACT SYMPMS: ICD-10-CM

## 2024-10-25 DIAGNOSIS — C61 MALIGNANT NEOPLASM OF PROSTATE: ICD-10-CM

## 2024-10-25 DIAGNOSIS — N18.9 CHRONIC KIDNEY DISEASE, UNSPECIFIED: ICD-10-CM

## 2024-10-25 DIAGNOSIS — R35.0 FREQUENCY OF MICTURITION: ICD-10-CM

## 2024-10-25 DIAGNOSIS — N13.8 BENIGN PROSTATIC HYPERPLASIA WITH LOWER URINARY TRACT SYMPMS: ICD-10-CM

## 2024-10-25 DIAGNOSIS — N18.5 CHRONIC KIDNEY DISEASE, STAGE 5: ICD-10-CM

## 2024-10-25 DIAGNOSIS — R97.20 ELEVATED PROSTATE, SPECIFIC ANTIGEN [PSA]: ICD-10-CM

## 2024-10-25 PROCEDURE — G2211 COMPLEX E/M VISIT ADD ON: CPT

## 2024-10-25 PROCEDURE — 99214 OFFICE O/P EST MOD 30 MIN: CPT

## 2024-10-25 RX ORDER — VALACYCLOVIR 1 G/1
1 TABLET, FILM COATED ORAL
Qty: 20 | Refills: 3 | Status: ACTIVE | COMMUNITY
Start: 2024-10-25 | End: 1900-01-01

## 2024-10-26 LAB
ANION GAP SERPL CALC-SCNC: 18 MMOL/L
APPEARANCE: CLEAR
BACTERIA: NEGATIVE /HPF
BILIRUBIN URINE: NEGATIVE
BLOOD URINE: NEGATIVE
BUN SERPL-MCNC: 88 MG/DL
CALCIUM SERPL-MCNC: 9.4 MG/DL
CAST: 11 /LPF
CHLORIDE SERPL-SCNC: 107 MMOL/L
CO2 SERPL-SCNC: 16 MMOL/L
COLOR: YELLOW
CREAT SERPL-MCNC: 2.71 MG/DL
EGFR: 24 ML/MIN/1.73M2
EPITHELIAL CELLS: 2 /HPF
GLUCOSE QUALITATIVE U: NEGATIVE MG/DL
GLUCOSE SERPL-MCNC: 173 MG/DL
HSV 1+2 IGG SER IA-IMP: POSITIVE
HSV 1+2 IGG SER IA-IMP: POSITIVE
HSV1 IGG SER QL: 51.5 INDEX
HSV2 IGG SER QL: 1.33 INDEX
HYALINE CASTS: PRESENT
KETONES URINE: NEGATIVE MG/DL
LEUKOCYTE ESTERASE URINE: NEGATIVE
MICROSCOPIC-UA: NORMAL
NITRITE URINE: NEGATIVE
PH URINE: 5.5
POTASSIUM SERPL-SCNC: 5 MMOL/L
PROTEIN URINE: 100 MG/DL
PSA FREE FLD-MCNC: 20 %
PSA FREE SERPL-MCNC: 0.42 NG/ML
PSA SERPL-MCNC: 2.14 NG/ML
RED BLOOD CELLS URINE: 0 /HPF
REVIEW: NORMAL
SODIUM SERPL-SCNC: 141 MMOL/L
SPECIFIC GRAVITY URINE: 1.02
UROBILINOGEN URINE: 0.2 MG/DL
WHITE BLOOD CELLS URINE: 1 /HPF

## 2024-12-06 ENCOUNTER — APPOINTMENT (OUTPATIENT)
Dept: UROLOGY | Facility: CLINIC | Age: 69
End: 2024-12-06
Payer: MEDICARE

## 2024-12-06 VITALS
RESPIRATION RATE: 18 BRPM | HEART RATE: 74 BPM | TEMPERATURE: 98 F | WEIGHT: 115 LBS | BODY MASS INDEX: 18.01 KG/M2 | OXYGEN SATURATION: 98 % | DIASTOLIC BLOOD PRESSURE: 71 MMHG | SYSTOLIC BLOOD PRESSURE: 137 MMHG

## 2024-12-06 DIAGNOSIS — N53.9 UNSPECIFIED MALE SEXUAL DYSFUNCTION: ICD-10-CM

## 2024-12-06 DIAGNOSIS — N13.8 BENIGN PROSTATIC HYPERPLASIA WITH LOWER URINARY TRACT SYMPMS: ICD-10-CM

## 2024-12-06 DIAGNOSIS — N40.1 BENIGN PROSTATIC HYPERPLASIA WITH LOWER URINARY TRACT SYMPMS: ICD-10-CM

## 2024-12-06 DIAGNOSIS — R35.0 FREQUENCY OF MICTURITION: ICD-10-CM

## 2024-12-06 DIAGNOSIS — R97.20 ELEVATED PROSTATE, SPECIFIC ANTIGEN [PSA]: ICD-10-CM

## 2024-12-06 DIAGNOSIS — C61 MALIGNANT NEOPLASM OF PROSTATE: ICD-10-CM

## 2024-12-06 PROCEDURE — G2211 COMPLEX E/M VISIT ADD ON: CPT

## 2024-12-06 PROCEDURE — 99214 OFFICE O/P EST MOD 30 MIN: CPT

## 2025-06-06 ENCOUNTER — APPOINTMENT (OUTPATIENT)
Dept: UROLOGY | Facility: CLINIC | Age: 70
End: 2025-06-06
Payer: MEDICARE

## 2025-06-06 VITALS
HEART RATE: 83 BPM | BODY MASS INDEX: 17.82 KG/M2 | SYSTOLIC BLOOD PRESSURE: 134 MMHG | TEMPERATURE: 98.1 F | WEIGHT: 113.8 LBS | DIASTOLIC BLOOD PRESSURE: 70 MMHG | RESPIRATION RATE: 18 BRPM | OXYGEN SATURATION: 99 %

## 2025-06-06 PROCEDURE — G2211 COMPLEX E/M VISIT ADD ON: CPT

## 2025-06-06 PROCEDURE — 99214 OFFICE O/P EST MOD 30 MIN: CPT
